# Patient Record
Sex: MALE | URBAN - METROPOLITAN AREA
[De-identification: names, ages, dates, MRNs, and addresses within clinical notes are randomized per-mention and may not be internally consistent; named-entity substitution may affect disease eponyms.]

---

## 2024-08-22 ENCOUNTER — OFFICE VISIT (OUTPATIENT)
Age: 34
End: 2024-08-22

## 2024-08-22 VITALS
DIASTOLIC BLOOD PRESSURE: 80 MMHG | RESPIRATION RATE: 16 BRPM | TEMPERATURE: 98.4 F | SYSTOLIC BLOOD PRESSURE: 124 MMHG | HEART RATE: 62 BPM | OXYGEN SATURATION: 97 %

## 2024-08-22 DIAGNOSIS — H10.32 ACUTE CONJUNCTIVITIS OF LEFT EYE, UNSPECIFIED ACUTE CONJUNCTIVITIS TYPE: Primary | ICD-10-CM

## 2024-08-22 RX ORDER — OFLOXACIN 3 MG/ML
2 SOLUTION/ DROPS OPHTHALMIC 3 TIMES DAILY
Qty: 5 ML | Refills: 0 | Status: SHIPPED | OUTPATIENT
Start: 2024-08-22 | End: 2024-08-29

## 2024-08-22 ASSESSMENT — ENCOUNTER SYMPTOMS
ABDOMINAL PAIN: 0
DIARRHEA: 0
SHORTNESS OF BREATH: 0
COUGH: 0
EYE DISCHARGE: 1
EYE ITCHING: 0
PHOTOPHOBIA: 0
EYE PAIN: 0
TROUBLE SWALLOWING: 0
VOMITING: 0
EYE REDNESS: 1

## 2024-08-22 ASSESSMENT — VISUAL ACUITY: OU: 1

## 2024-08-22 NOTE — PATIENT INSTRUCTIONS
New Prescriptions    OFLOXACIN (OCUFLOX) 0.3 % SOLUTION    Place 2 drops into the left eye in the morning, at noon, and at bedtime for 7 days     Follow up with PCP in 1 week or sooner for worsening symptoms

## 2024-08-22 NOTE — PROGRESS NOTES
José Luis Dodson (:  1990) is a 33 y.o. male, New patient, here for evaluation of the following chief complaint(s):  Eye Problem (Pt c/o left eye lid swollen, red and has drainage, light sensitivity x 2 days)    ASSESSMENT/PLAN:    ICD-10-CM    1. Acute conjunctivitis of left eye, unspecified acute conjunctivitis type  H10.32 ofloxacin (OCUFLOX) 0.3 % solution        Ddx includes: Bacterial conjunctivitis, Blepharitis, Viral conjunctivitis, Corneal abrasion, periorbital cellulitis, STD  Disposition: Pt is 34yo male here with left irritation. Endorses exposure to sick contacts. VSS. he has no photophobia, change in vission, or pain with extraocular movement. Denies fb sensation. PERRLA on exam. Normal vissual acuity on exam. Left conjunctival injection noted. Will prescribe him Ocuflox eye drops as directed .  Reviewed AVS with patient. All questions answered. If symptoms worsen go to the Emergency Department. Follow up in clinic or with PCP as needed. Patient is agreeable with plan.     SUBJECTIVE/OBJECTIVE:   34yo male here with c/o  left eye redness, drainage and irritation x3dys.  Reports he was sent home from work today and needs a note. Denies any other systemic symptoms including fever chills, eye pain, ha or visision changes. Reports he woke up this morning with his left eye shut closed with crusty drainage.      History provided by:  Patient   used: No      Vitals:    24 1235   BP: 124/80   Pulse: 62   Resp: 16   Temp: 98.4 °F (36.9 °C)   SpO2: 97%     Review of Systems   Constitutional:  Negative for fever.   HENT:  Negative for trouble swallowing.    Eyes:  Positive for discharge and redness. Negative for photophobia, pain, itching and visual disturbance.   Respiratory:  Negative for cough and shortness of breath.    Cardiovascular:  Negative for chest pain.   Gastrointestinal:  Negative for abdominal pain, diarrhea and vomiting.   Neurological:  Negative for